# Patient Record
Sex: FEMALE | Race: WHITE | Employment: FULL TIME | URBAN - METROPOLITAN AREA
[De-identification: names, ages, dates, MRNs, and addresses within clinical notes are randomized per-mention and may not be internally consistent; named-entity substitution may affect disease eponyms.]

---

## 2017-07-17 ENCOUNTER — HOSPITAL ENCOUNTER (EMERGENCY)
Facility: CLINIC | Age: 22
Discharge: HOME | End: 2017-07-17
Attending: EMERGENCY MEDICINE | Admitting: EMERGENCY MEDICINE
Payer: COMMERCIAL

## 2017-07-17 ENCOUNTER — APPOINTMENT (OUTPATIENT)
Dept: GENERAL RADIOLOGY | Facility: CLINIC | Age: 22
End: 2017-07-17
Attending: EMERGENCY MEDICINE
Payer: COMMERCIAL

## 2017-07-17 VITALS
BODY MASS INDEX: 20.49 KG/M2 | HEIGHT: 64 IN | HEART RATE: 79 BPM | WEIGHT: 120 LBS | SYSTOLIC BLOOD PRESSURE: 133 MMHG | TEMPERATURE: 98.6 F | RESPIRATION RATE: 16 BRPM | DIASTOLIC BLOOD PRESSURE: 77 MMHG | OXYGEN SATURATION: 100 %

## 2017-07-17 DIAGNOSIS — M54.6 ACUTE MIDLINE THORACIC BACK PAIN: ICD-10-CM

## 2017-07-17 PROCEDURE — 72072 X-RAY EXAM THORAC SPINE 3VWS: CPT

## 2017-07-17 PROCEDURE — 99283 EMERGENCY DEPT VISIT LOW MDM: CPT

## 2017-07-17 RX ORDER — LEVONORGESTREL/ETHIN.ESTRADIOL 0.1-0.02MG
1 TABLET ORAL DAILY
COMMUNITY

## 2017-07-17 ASSESSMENT — ENCOUNTER SYMPTOMS
CHILLS: 0
FEVER: 0
BACK PAIN: 1

## 2017-07-17 NOTE — DISCHARGE INSTRUCTIONS
Discharge Instructions  Back Pain  You were seen today for back pain. Back pain can have many causes, but most will get better without surgery or other specific treatment. Sometimes there is a herniated ( slipped ) disc. We don t usually do MRI scans to look for these right away, since most herniated discs will get better on their own with time.  Today, we did not find any evidence that your back pain was caused by a serious condition, such as an infection, fracture, or tumor. However, sometimes symptoms develop over time and cannot be found during an emergency visit, so it is very important that you follow up with your primary doctor.  Return to the Emergency Department if:    You develop a fever with your back pain.     You have weakness or change in sensation in one or both legs.    You lose control of your bowels or bladder, or can t empty your bladder.    Your pain gets much worse.     Follow-up with your doctor:    Unless your pain has completely gone away, please make an appointment with your doctor within one week.  You may need further management of your back pain, such as more pain medication, imaging such as an X-ray or MRI, or physical therapy.    What can I do to help myself?    Remain Active -- People are often afraid that they will hurt their back further or delay recovery by remaining active, but this is one of the best things you can do for your back. In fact, prolonged bed rest is not recommended. Studies have shown that people with low back pain recover faster when they remain active. Movement helps to bring blood flow to the muscles and relieve muscle spasms as well as preventing loss of muscle strength.    Heat -- Using a heating pad can help with low back pain during the first few weeks. Do not sleep with a heating pad, as you can be burned.     Pain medications - You may take a pain medication such as Tylenol  (acetaminophen), Advil , Nuprin  (ibuprofen) or Aleve  (naproxen).  If you have  been given a narcotic such as Vicodin  (hydrocodone with acetaminophen), Percocet  (oxycodone with acetaminophen), codeine, or a muscle relaxant such as Flexeril  (cyclobenzaprine) or Soma  (carisoprodol), do not drive for four hours after you have taken it. If the narcotic contains Tylenol  (acetaminophen), do not take Tylenol  with it. All narcotics will cause constipation, so eat a high fiber diet.   If you were given a prescription for medicine here today, be sure to read all of the information (including the package insert) that comes with your prescription.  This will include important information about the medicine, its side effects, and any warnings that you need to know about.  The pharmacist who fills the prescription can provide more information and answer questions you may have about the medicine.  If you have questions or concerns that the pharmacist cannot address, please call or return to the Emergency Department.       Remember that you can always come back to the Emergency Department if you are not able to see your regular doctor in the amount of time listed above, if you get any new symptoms, or if there is anything that worries you.

## 2017-07-17 NOTE — ED NOTES
Upper mid back pain x 4 months.  No injury.  Has been taking ibuprofen and using icy hot patches without relief.  ABCDs intact.

## 2017-07-17 NOTE — ED PROVIDER NOTES
"  History     Chief Complaint:  Upper back pain    HPI   Mona Hassan is a 22 year old female who presents to the emergency department today for evaluation of upper mid back pain. The patient reports upper mid back pain since the end of April, accompanied by some chest tightness with deep breathing. She has tried ibuprofen and icy hot cream without alleviation of pain. The patient also reports feeling warm, but denies chills, fever, or heavy lifting. Of note, she got  in April, but hasn't been using new mattresses or chairs.    Allergies:  No Known Drug Allergies    Medications:    Levonorgestrel-ethinyl estradiol    Past Medical History:    H. Pylori infection    Past Surgical History:    History reviewed. No pertinent past surgical history.    Family History:    History reviewed. No pertinent family history.     Social History:  Smoking Status: Never smoker  Alcohol Use: No  Marital Status:      Review of Systems   Constitutional: Negative for chills and fever.   Musculoskeletal: Positive for back pain.   All other systems reviewed and are negative.    Physical Exam   Vitals:  Patient Vitals for the past 24 hrs:   BP Temp Temp src Pulse Resp SpO2 Height Weight   07/17/17 1125 133/77 98.6  F (37  C) Temporal 79 16 100 % 1.626 m (5' 4\") 54.4 kg (120 lb)     Physical Exam  Constitutional: Alert, attentive, GCS 15  HENT:    Nose: Nose normal.    Mouth/Throat: Oropharynx is clear, mucous membranes are moist  Eyes: EOM are normal. Pupils are equal, round, and reactive to light.   CV: brisk capillary refill to the distal extremities, 2+ DP pulses bilaterally.  Chest: Effort normal and breath sounds normal.   GI: No distension. There is no tenderness to the RUQ, RLQ or LLQ. No Hurtado's sign or McBurney's point tenderness. No palpable, pulsatile mass.  MSK: Normal range of motion. Vague T9/10 midline spine tenderness, and adjacent paraspinous muscle tenderness; no stepoffs;   Neurological: Alert, " attentive.  GCS 15; 5/5 strength throughout the bilateral lower extremities (hip flexion/extension, knee flexion/extension, DF/PF, EHL/FHL); sensation intact to light touch throughout L2-S1 distributions to the lower extremities; 2+ DTRs to the bilateral lower extremities (patellar, achilles); normal gait  Skin: Skin is warm and dry.       Emergency Department Course     Imaging:  Radiology findings were communicated with the patient who voiced understanding of the findings.    Thoracic spine XR, 3 views  Motion on the lateral view. Otherwise negative thoracic spine. Partially visualized lumbar scoliosis.  Reading per radiology    Emergency Department Course:  Nursing notes and vitals reviewed.  I performed an exam of the patient as documented above.   The patient was sent for a Thoracic spine XR, 3 views while in the emergency department, results above.   At 1250 the patient was rechecked and was updated on the results of imaging studies.   I discussed the treatment plan with the patient. She expressed understanding of this plan and consented to discharge. She will be discharged home with instructions for care and follow up. In addition, the patient will return to the emergency department if their symptoms persist, worsen, if new symptoms arise or if there is any concern.  All questions were answered.  I personally reviewed the imaging results with the Patient and answered all related questions prior to discharge.    Impression & Plan      Medical Decision Making:  This is a very pleasant 22 year old female who presented with low thoracic back pain for several months. There was no trauma or history of heavy lifting associated. Exam shows vague midline tenderness at ~T9/10 and nearby paraspinous muscle tenderness. X-rays show no acute abnormality. She has no history of IVDA or fever to suggest epidural abscess. She has a normal neurologic exam. I discussed the unclear nature of her symptoms and recommended  supportive cares for possible musculoskeletal pain, and I provided a spine referral. I advised she schedule spine follow-up within 3-5 days to re-assess symptoms and I advised strict return precautions for pain, fever, weakness, or any other concerning symptoms.        Diagnosis:    ICD-10-CM    1. Acute midline thoracic back pain M54.6      Disposition:   Home     Scribe Disclosure:  I, Teresa Dasilva, am serving as a scribe at 11:37 AM on 7/17/2017 to document services personally performed by Harsha Hernandez MD, based on my observations and the provider's statements to me.    7/17/2017   [facility] EMERGENCY DEPARTMENT       Harsha Hernandez MD  07/17/17 1403

## 2017-07-17 NOTE — ED AVS SNAPSHOT
[facility] Emergency Department    [address]    [facility] [address]    Phone:  [phone]    Fax:  [phone]                                       Mona Hassan   MRN: [id number]    Department:  [facility] Emergency Department   Date of Visit:  7/17/2017           After Visit Summary Signature Page     I have received my discharge instructions, and my questions have been answered. I have discussed any challenges I see with this plan with the nurse or doctor.    ..........................................................................................................................................  Patient/Patient Representative Signature      ..........................................................................................................................................  Patient Representative Print Name and Relationship to Patient    ..................................................               ................................................  Date                                            Time    ..........................................................................................................................................  Reviewed by Signature/Title    ...................................................              ..............................................  Date                                                            Time

## 2017-07-17 NOTE — ED AVS SNAPSHOT
[facility] Emergency Department    [address] [place] [address]    Phone:  [phone]    Fax:  [phone]                                       Mona Hassan   MRN: [id number]    Department:  [facility] Emergency Department   Date of Visit:  7/17/2017           Patient Information     Date Of Birth          1995        Your diagnoses for this visit were:     Acute midline thoracic back pain        You were seen by Harsha Hernandez MD.      Follow-up Information     Follow up with Leodan Hull MD In 1 week.    Specialty:  Neurosurgery    Contact information:    [facility]  [address]  [place] [address]  [phone]          Discharge Instructions         Discharge Instructions  Back Pain  You were seen today for back pain. Back pain can have many causes, but most will get better without surgery or other specific treatment. Sometimes there is a herniated ( slipped ) disc. We don t usually do MRI scans to look for these right away, since most herniated discs will get better on their own with time.  Today, we did not find any evidence that your back pain was caused by a serious condition, such as an infection, fracture, or tumor. However, sometimes symptoms develop over time and cannot be found during an emergency visit, so it is very important that you follow up with your primary doctor.  Return to the Emergency Department if:    You develop a fever with your back pain.     You have weakness or change in sensation in one or both legs.    You lose control of your bowels or bladder, or can t empty your bladder.    Your pain gets much worse.     Follow-up with your doctor:    Unless your pain has completely gone away, please make an appointment with your doctor within one week.  You may need further management of your back pain, such as more pain medication, imaging such as an X-ray or MRI, or physical  therapy.    What can I do to help myself?    Remain Active -- People are often afraid that they will hurt their back further or delay recovery by remaining active, but this is one of the best things you can do for your back. In fact, prolonged bed rest is not recommended. Studies have shown that people with low back pain recover faster when they remain active. Movement helps to bring blood flow to the muscles and relieve muscle spasms as well as preventing loss of muscle strength.    Heat -- Using a heating pad can help with low back pain during the first few weeks. Do not sleep with a heating pad, as you can be burned.     Pain medications - You may take a pain medication such as Tylenol  (acetaminophen), Advil , Nuprin  (ibuprofen) or Aleve  (naproxen).  If you have been given a narcotic such as Vicodin  (hydrocodone with acetaminophen), Percocet  (oxycodone with acetaminophen), codeine, or a muscle relaxant such as Flexeril  (cyclobenzaprine) or Soma  (carisoprodol), do not drive for four hours after you have taken it. If the narcotic contains Tylenol  (acetaminophen), do not take Tylenol  with it. All narcotics will cause constipation, so eat a high fiber diet.   If you were given a prescription for medicine here today, be sure to read all of the information (including the package insert) that comes with your prescription.  This will include important information about the medicine, its side effects, and any warnings that you need to know about.  The pharmacist who fills the prescription can provide more information and answer questions you may have about the medicine.  If you have questions or concerns that the pharmacist cannot address, please call or return to the Emergency Department.       Remember that you can always come back to the Emergency Department if you are not able to see your regular doctor in the amount of time listed above, if you get any new symptoms, or if there is anything that worries  you.        24 Hour Appointment Hotline       To make an appointment at any [facility], call [phone] ([phone]). If you don't have a family doctor or clinic, we will help you find one. [facility] clinics are conveniently located to serve the needs of you and your family.             Review of your medicines      Our records show that you are taking the medicines listed below. If these are incorrect, please call your family doctor or clinic.        Dose / Directions Last dose taken    IBUPROFEN PO        Refills:  0        levonorgestrel-ethinyl estradiol 0.1-20 MG-MCG per tablet   Commonly known as:  AVIANE,ALESSE,LESSINA   Dose:  1 tablet        Take 1 tablet by mouth daily   Refills:  0        menthol 5 % Ptch   Commonly known as:  ICY HOT   Dose:  1 patch        Apply 1 patch topically every 8 hours as needed for muscle soreness   Refills:  0                Procedures and tests performed during your visit     Thoracic spine XR, 3 views      Orders Needing Specimen Collection     None      Pending Results     No orders found from 7/15/2017 to 7/18/2017.            Pending Culture Results     No orders found from 7/15/2017 to 7/18/2017.            Pending Results Instructions     If you had any lab results that were not finalized at the time of your Discharge, you can call the ED Lab Result RN at [phone]. You will be contacted by this team for any positive Lab results or changes in treatment. The nurses are available 7 days a week from 10A to 6:30P.  You can leave a message 24 hours per day and they will return your call.        Test Results From Your Hospital Stay        7/17/2017 12:31 PM      Narrative     XR THORACIC SPINE 3 VW  7/17/2017 12:13 PM    HISTORY:  low thoracic spine pain    COMPARISON:  None.        Impression     IMPRESSION:  Motion on the lateral view. Otherwise negative thoracic  spine. Partially visualized lumbar scoliosis.    JACOB THRASHER MD                Clinical  Quality Measure: Blood Pressure Screening     Your blood pressure was checked while you were in the emergency department today. The last reading we obtained was  BP: 133/77 . Please read the guidelines below about what these numbers mean and what you should do about them.  If your systolic blood pressure (the top number) is less than 120 and your diastolic blood pressure (the bottom number) is less than 80, then your blood pressure is normal. There is nothing more that you need to do about it.  If your systolic blood pressure (the top number) is 120-139 or your diastolic blood pressure (the bottom number) is 80-89, your blood pressure may be higher than it should be. You should have your blood pressure rechecked within a year by a primary care provider.  If your systolic blood pressure (the top number) is 140 or greater or your diastolic blood pressure (the bottom number) is 90 or greater, you may have high blood pressure. High blood pressure is treatable, but if left untreated over time it can put you at risk for heart attack, stroke, or kidney failure. You should have your blood pressure rechecked by a primary care provider within the next 4 weeks.  If your provider in the emergency department today gave you specific instructions to follow-up with your doctor or provider even sooner than that, you should follow that instruction and not wait for up to 4 weeks for your follow-up visit.        Thank you for choosing [facility]       Thank you for choosing [facility] for your care. Our goal is always to provide you with excellent care. Hearing back from our patients is one way we can continue to improve our services. Please take a few minutes to complete the written survey that you may receive in the mail after you visit with us. Thank you!        [employer] Information     [employer] lets you send messages to your doctor, view your test results, renew your prescriptions, schedule appointments and more. To sign up, go to  [email or web address] . Click on \"Log in\" on the left side of the screen, which will take you to the Welcome page. Then click on \"Sign up Now\" on the right side of the page.     You will be asked to enter the access code listed below, as well as some personal information. Please follow the directions to create your username and password.     Your access code is: [email or web address]  Expires: 10/15/2017 12:58 PM     Your access code will  in 90 days. If you need help or a new code, please call your [facility] clinic or [phone].        Care EveryWhere ID     This is your Care EveryWhere ID. This could be used by other organizations to access your [facility] medical records  [id number]        Equal Access to Services     DELGADO CASTREJON : Rosetta Parker, tres buck, beka maki, paris salcedo. So [facility] [phone].    ATENCIÓN: Si habla español, tiene a neville disposición servicios gratuitos de asistencia lingüística. Llame al [phone].    We comply with applicable federal civil rights laws and [place] laws. We do not discriminate on the basis of race, color, national origin, age, disability sex, sexual orientation or gender identity.            After Visit Summary       This is your record. Keep this with you and show to your community pharmacist(s) and doctor(s) at your next visit.                  "

## 2017-07-17 NOTE — LETTER
[facility] EMERGENCY DEPARTMENT  [address] [place] [address]  [phone]    2017    Mona Cantully Mo  [address]   [place] [address]  [phone] (home)     : 1995      To Whom it may concern:    Mona Hassan was seen in our Emergency Department today, 2017.  Please excuse her from work today and tomorrow.    Sincerely,        Harsha Hernandez MD

## 2017-07-26 ENCOUNTER — OFFICE VISIT (OUTPATIENT)
Dept: NEUROSURGERY | Facility: CLINIC | Age: 22
End: 2017-07-26
Attending: NURSE PRACTITIONER
Payer: COMMERCIAL

## 2017-07-26 VITALS
OXYGEN SATURATION: 98 % | WEIGHT: 130 LBS | DIASTOLIC BLOOD PRESSURE: 52 MMHG | BODY MASS INDEX: 22.31 KG/M2 | SYSTOLIC BLOOD PRESSURE: 109 MMHG | HEART RATE: 68 BPM

## 2017-07-26 DIAGNOSIS — M54.6 THORACIC SPINE PAIN: Primary | ICD-10-CM

## 2017-07-26 PROCEDURE — 99211 OFF/OP EST MAY X REQ PHY/QHP: CPT | Performed by: NURSE PRACTITIONER

## 2017-07-26 PROCEDURE — 99203 OFFICE O/P NEW LOW 30 MIN: CPT | Performed by: NURSE PRACTITIONER

## 2017-07-26 ASSESSMENT — PAIN SCALES - GENERAL: PAINLEVEL: MILD PAIN (3)

## 2017-07-26 NOTE — PATIENT INSTRUCTIONS
1.  Please schedule your physical therapy.        2. Please contact the clinic if pain persists at [phone].   Then we will order a thoracic MRI.

## 2017-07-26 NOTE — NURSING NOTE
"Mona Hassan is a 22 year old female who presents for:  Chief Complaint   Patient presents with     Neurologic Problem     follow up from ED mid back pain        Initial Vitals:  /52  Pulse 68  Wt 59 kg (130 lb)  LMP 06/26/2017  SpO2 98%  BMI 22.31 kg/m2 Estimated body mass index is 22.31 kg/(m^2) as calculated from the following:    Height as of 7/17/17: 1.626 m (5' 4\").    Weight as of this encounter: 59 kg (130 lb).. Body surface area is 1.63 meters squared. BP completed using cuff size: small regular  Mild Pain (3)    Do you feel safe in your environment?  Yes  Do you need any refills today? No    Nursing Comments:   Patient rates bilateral low back  pain today as 3  5 min. nursing intake time    Discharge plan: See provider's dictation.    2 min. nursing discharge time  Zulema Beltran CMA    "

## 2017-07-26 NOTE — PROGRESS NOTES
Dr. Leodan Hull  [facility]  Neurosurgery Clinic Visit        CC: mid back pain    Primary care Provider: [facility], [place]      Reason For Visit:   I was asked by [facility] to consult on the patient for mid back pain.      HPI: Mona Hassan is a 22 year old female with mid back pain. She reports that had constant pain so she went to the ER.  She reports pain across the mid back but was concerned that she was told that she had low back scoliosis.  She has not had recent PT or injections. She denies any weakness or radicular symptoms. She feels that the pain is intermittent.      Pain at its worst 10  Pain right now:  3    Past Medical History:   Diagnosis Date     H. pylori infection        Past Medical History reviewed with patient during visit.    No past surgical history on file.  Past Surgical History reviewed with patient during visit.    Current Outpatient Prescriptions   Medication     levonorgestrel-ethinyl estradiol (AVIANE,ALESSE,LESSINA) 0.1-20 MG-MCG per tablet     IBUPROFEN PO     menthol (ICY HOT) 5 % PTCH     No current facility-administered medications for this visit.        No Known Allergies    Social History     Social History     Marital status: Single     Spouse name: N/A     Number of children: N/A     Years of education: N/A     Social History Main Topics     Smoking status: Never Smoker     Smokeless tobacco: None     Alcohol use No     Drug use: None     Sexual activity: Not Asked     Other Topics Concern     None     Social History Narrative       No family history on file.      Review Of Systems  Skin: negative  Eyes: negative  Ears/Nose/Throat: negative  Respiratory: No shortness of breath, dyspnea on exertion, cough, or hemoptysis  Cardiovascular: negative  Gastrointestinal: negative  Genitourinary: negative  Musculoskeletal: back pain  Neurologic: negative  Psychiatric: negative  Hematologic/Lymphatic/Immunologic: negative  Endocrine:  negative     ROS: 10 point ROS neg other than the symptoms noted above in the HPI.      Vital Signs: /52  Pulse 68  Wt 130 lb (59 kg)  LMP 06/26/2017  SpO2 98%  BMI 22.31 kg/m2    Examination:  Constitutional:  Alert, well nourished, NAD.  HEENT: Normocephalic, atraumatic.   Pulm:  Without shortness of breath   CV:  No pitting edema of BLE.    Neurological:  Awake  Alert  Oriented x 3  Speech clear  Cranial nerves II - XII intact  PERRL  EOMI  Face symmetric  Tongue midline  Motor exam   Shoulder Abduction:  Right:  5/5   Left:  5/5  Biceps:                      Right:  5/5   Left:  5/5  Triceps:                     Right:  5/5   Left:  5/5  Wrist Extensors:       Right:  5/5   Left:  5/5  Wrist Flexors:           Right:  5/5   Left:  5/5  Intrinsics:                   Right:  5/5   Left:  5/5   Hip Flexor:                Right: 5/5  Left:  5/5  Hip Adductor:             Right:  5/5  Left:  5/5  Hip Abductor:             Right:  5/5  Left:  5/5  Gastroc Soleus:        Right:  5/5  Left:  5/5  Tib/Ant:                      Right:  5/5  Left:  5/5  EHL:                          Right:  5/5  Left:  5/5   Sensation normal to bilateral upper and lower extremities    Gait: Able to stand from a seated position. Normal non-antalgic, non-myelopathic gait.  Able to heel/toe walk without loss of balance  Thoracic exam reveals full ROM and pain without pain upon palpation    Lumbar examination reveals no tenderness of the spine or paraspinous muscles.       Imaging: IMPRESSION:  Motion on the lateral view. Otherwise negative thoracic  spine. Partially visualized lumbar scoliosis.    Assessment/Plan:   Mona Hassan is a 22 year old female with mid back pain. She reports that had constant pain so she went to the ER.  She reports pain across the mid back but was concerned that she was told that she had low back scoliosis.  She has not had recent PT or injections. She denies any weakness or radicular symptoms.  She feels that the pain is intermittent.  Her exam today is normal. Explained that her lumbar scoliosis is mild.  She does not have pain to radicular symptoms.  It was recommended that she start with PT. She is open to this.        Patient Instructions   1.  Please schedule your physical therapy.        2. Please contact the clinic if pain persists at [phone].   Then we will order a thoracic MRI.          Ericka Walker [facility]  [facility]ine [facility]  [address] [address]    Tel [phone]  Pager [phone]

## 2017-07-26 NOTE — MR AVS SNAPSHOT
After Visit Summary   7/26/2017    Mona Hassan    MRN: [id number]           Patient Information     Date Of Birth          1995        Visit Information        Provider Department      7/26/2017 9:20 AM Ericka Walker APRN CNP [facility]        Today's Diagnoses     Thoracic spine pain    -  1      Care Instructions    1.  Please schedule your physical therapy.        2. Please contact the clinic if pain persists at [phone].   Then we will order a thoracic MRI.           Follow-ups after your visit        Additional Services     [facility] PT, HAND, AND CHIROPRACTIC REFERRAL       **This order will print in the [facility] Scheduling Office**    Physical Therapy, Hand Therapy and Chiropractic Care are available through:    *[facility]  *[facility]  *[facility]    Call one number to schedule at any of the above locations: [phone].    Your provider has referred you to: Physical Therapy at [facility] or [facility]    Indication/Reason for Referral: thoracic spine pain   Onset of Illness: chronic  Therapy Orders: Evaluate and Treat  Special Programs: None  Special Request: None    Duane Zepeda      Additional Comments for the Therapist or Chiropractor:         Please be aware that coverage of these services is subject to the terms and limitations of your health insurance plan.  Call member services at your health plan with any benefit or coverage questions.      Please bring the following to your appointment:    *Your personal calendar for scheduling future appointments  *Comfortable clothing                  Who to contact     If you have questions or need follow up information about today's clinic visit or your schedule please contact [facility] directly at [phone].  Normal or non-critical lab and imaging results will be communicated to you by MyChart, letter or phone within 4 business days after  "the clinic has received the results. If you do not hear from us within 7 days, please contact the clinic through [employer] or phone. If you have a critical or abnormal lab result, we will notify you by phone as soon as possible.  Submit refill requests through [employer] or call your pharmacy and they will forward the refill request to us. Please allow 3 business days for your refill to be completed.          Additional Information About Your Visit        [employer] Information     [employer] lets you send messages to your doctor, view your test results, renew your prescriptions, schedule appointments and more. To sign up, go to [email or web address] . Click on \"Log in\" on the left side of the screen, which will take you to the Welcome page. Then click on \"Sign up Now\" on the right side of the page.     You will be asked to enter the access code listed below, as well as some personal information. Please follow the directions to create your username and password.     Your access code is: [email or web address]  Expires: 10/15/2017 12:58 PM     Your access code will  in 90 days. If you need help or a new code, please call your [place] clinic or [phone].        Care EveryWhere ID     This is your Care EveryWhere ID. This could be used by other organizations to access your [place] medical records  [id number]        Your Vitals Were     Pulse Last Period Pulse Oximetry BMI (Body Mass Index)          68 2017 98% 22.31 kg/m2         Blood Pressure from Last 3 Encounters:   17 109/52   17 133/77   16 103/63    Weight from Last 3 Encounters:   17 130 lb (59 kg)   17 120 lb (54.4 kg)   16 115 lb (52.2 kg)              We Performed the Following     [facility] PT, HAND, AND CHIROPRACTIC REFERRAL        Primary Care Provider Office Phone # Fax #    [facility] [phone] [address] [place] [address]        Equal Access to Services     DELGADO CASTREJON AH: " Hadii aad ku hadaltafo Soinderali, waaxda luqadaha, qaybta kaalwilder maki, paris miladavid salcedo. So [facility] [phone].    ATENCIÓN: Si kaylee bower, tiene a neville disposición servicios gratuitos de asistencia lingüística. Llame al [phone].    We comply with applicable federal civil rights laws and [place] laws. We do not discriminate on the basis of race, color, national origin, age, disability sex, sexual orientation or gender identity.            Thank you!     Thank you for choosing [facility]  for your care. Our goal is always to provide you with excellent care. Hearing back from our patients is one way we can continue to improve our services. Please take a few minutes to complete the written survey that you may receive in the mail after your visit with us. Thank you!             Your Updated Medication List - Protect others around you: Learn how to safely use, store and throw away your medicines at www.disposemymeds.org.          This list is accurate as of: 7/26/17  9:48 AM.  Always use your most recent med list.                   Brand Name Dispense Instructions for use Diagnosis    IBUPROFEN PO           levonorgestrel-ethinyl estradiol 0.1-20 MG-MCG per tablet    ZOE WEINER LESSINA     Take 1 tablet by mouth daily        menthol 5 % Ptch    ICY HOT     Apply 1 patch topically every 8 hours as needed for muscle soreness

## 2017-08-14 ENCOUNTER — THERAPY VISIT (OUTPATIENT)
Dept: PHYSICAL THERAPY | Facility: CLINIC | Age: 22
End: 2017-08-14
Payer: COMMERCIAL

## 2017-08-14 DIAGNOSIS — M54.6 ACUTE BILATERAL THORACIC BACK PAIN: Primary | ICD-10-CM

## 2017-08-14 PROCEDURE — 97110 THERAPEUTIC EXERCISES: CPT | Mod: GP | Performed by: PHYSICAL THERAPIST

## 2017-08-14 PROCEDURE — 97161 PT EVAL LOW COMPLEX 20 MIN: CPT | Mod: GP | Performed by: PHYSICAL THERAPIST

## 2017-08-14 NOTE — PROGRESS NOTES
Subjective:    Patient is a 22 year old female presenting with rehab cervical spine hpi. The history is provided by the patient. No  was used.   Mona Hassan is a 22 year old female with a thoracic spine condition.  Condition occurred with:  Repetition/overuse.  Condition occurred: for unknown reasons and at work.  This is a new condition  Onset of bilateral thoracic pain 4-17 secondary to repetitive lifting at work. Pt can't recall on specific incident that brought on the pain. Pt seen at [facility] ED secondary to pain on 7-17-17 with follow up appointment with Ericka Walker on 7-26-17. Pt referred for physical therapy on 7-26-17.    Patient reports pain:  Thoracic right side and thoracic left side.    Pain is described as aching and sharp and is intermittent and reported as 4/10.  Associated symptoms:  Loss of motion/stiffness and loss of strength. Pain is the same all the time.  Symptoms are exacerbated by lifting, carrying and driving (working on the computer, pushing a grocery cart) and relieved by rest and NSAID's.  Since onset symptoms are gradually improving.  Special tests:  X-ray (see report).  Previous treatment: none.    General health as reported by patient is excellent.  Pertinent medical history includes:  None.  Medical allergies: no.  Other surgeries include:  No.  Medication history: birth controll.  Current occupation is Nursing assistant.  Patient is working in normal job without restrictions.  Primary job tasks include:  Prolonged standing, lifting and repetitive tasks.        Red flags:  None as reported by the patient.                        Objective:    Standing Alignment:      Shoulder/upper extremity deviations alignment: forward head and shoulder posture, thoracic kyphosis.                                  Cervical/Thoracic Evaluation  Cervical AROM: normal     AROM:    AROM Thoracic:    Flexion:               80%  Extension:          50%  Rotation:             Left: 60%     Right: 60%      Headaches: none  Cervical Myotomes:  normal                  DTR's:  normal          Cervical Dermatomes:  normal                    Cervical Palpation:  : point tenderness T1-T7, muscle guarding and tenderness to palpation bilateral rhomboids.                                                      General     ROS    Assessment/Plan:      Patient is a 22 year old female with thoracic complaints.    Patient has the following significant findings with corresponding treatment plan.                Diagnosis 1:  Thoracic strain  Pain -  hot/cold therapy, self management, education, home program and manual therapy as indicated  Decreased ROM/flexibility - therapeutic exercise, therapeutic activity, home program and manual therapy as indicated  Decreased strength - therapeutic exercise, therapeutic activities and home program    Therapy Evaluation Codes:   1) History comprised of:   Personal factors that impact the plan of care:      Profession.    Comorbidity factors that impact the plan of care are:      Weakness.     Medications impacting care: birth control.  2) Examination of Body Systems comprised of:   Body structures and functions that impact the plan of care:      Cervical spine, Shoulder and Thoracic Spine.   Activity limitations that impact the plan of care are:      Driving, Lifting, Standing, Working and pushing/pulling.  3) Clinical presentation characteristics are:   Stable/Uncomplicated.  4) Decision-Making    Low complexity using standardized patient assessment instrument and/or measureable assessment of functional outcome.  Cumulative Therapy Evaluation is: Low complexity.    Previous and current functional limitations:  (See Goal Flow Sheet for this information)    Short term and Long term goals: (See Goal Flow Sheet for this information)     Communication ability:  Patient appears to be able to clearly communicate and understand verbal and written communication and follow  directions correctly.  Treatment Explanation - The following has been discussed with the patient:   RX ordered/plan of care  Anticipated outcomes  Possible risks and side effects  This patient would benefit from PT intervention to resume normal activities.   Rehab potential is good.    Frequency:  1 X week, once daily  Duration:  for 6 weeks  Discharge Plan:  Achieve all LTG.  Independent in home treatment program.  Reach maximal therapeutic benefit.    Please refer to the daily flowsheet for treatment today, total treatment time and time spent performing 1:1 timed codes.

## 2017-08-14 NOTE — MR AVS SNAPSHOT
"              After Visit Summary   2017    Mona Hassan    MRN: [id number]           Patient Information     Date Of Birth          1995        Visit Information        Provider Department      2017 9:50 AM Jordon Velazquez, PT [facility]        Today's Diagnoses     Acute bilateral thoracic back pain    -  1       Follow-ups after your visit        Who to contact     If you have questions or need follow up information about today's clinic visit or your schedule please contact [facility] directly at [phone].  Normal or non-critical lab and imaging results will be communicated to you by Yaolan.comhart, letter or phone within 4 business days after the clinic has received the results. If you do not hear from us within 7 days, please contact the clinic through Yaolan[employer]comhart or phone. If you have a critical or abnormal lab result, we will notify you by phone as soon as possible.  Submit refill requests through [employer] or call your pharmacy and they will forward the refill request to us. Please allow 3 business days for your refill to be completed.          Additional Information About Your Visit        MyChart Information     [employer] lets you send messages to your doctor, view your test results, renew your prescriptions, schedule appointments and more. To sign up, go to [email or web address] . Click on \"Log in\" on the left side of the screen, which will take you to the Welcome page. Then click on \"Sign up Now\" on the right side of the page.     You will be asked to enter the access code listed below, as well as some personal information. Please follow the directions to create your username and password.     Your access code is: [email or web address]  Expires: 10/15/2017 12:58 PM     Your access code will  in 90 days. If you need help or a new code, please call your [place] clinic or [phone].        Care EveryWhere ID     This is your Care EveryWhere ID. This could be used by other " organizations to access your [place] medical records  [id number]        Your Vitals Were     Last Period                   06/26/2017            Blood Pressure from Last 3 Encounters:   07/26/17 109/52   07/17/17 133/77   04/02/16 103/63    Weight from Last 3 Encounters:   07/26/17 59 kg (130 lb)   07/17/17 54.4 kg (120 lb)   04/01/16 52.2 kg (115 lb)              We Performed the Following     [facility] Inital Eval Report     PT Eval, Low Complexity (49072)     Therapeutic Exercises        Primary Care Provider Office Phone # Fax #    [place] [facility] [phone] [address] [place] [address]        Equal Access to Services     DELGADO CASTREJON : Hadii aad ku hadasho Soomaali, waaxda luqadaha, qaybta kaalmada adeegyada, waxay milain haychantal price . So [facility] [phone].    ATENCIÓN: Si habla español, tiene a neville disposición servicios gratuitos de asistencia lingüística. Ll[facility] [phone].    We comply with applicable federal civil rights laws and [place] laws. We do not discriminate on the basis of race, color, national origin, age, disability sex, sexual orientation or gender identity.            Thank you!     Thank you for choosing [facility]  for your care. Our goal is always to provide you with excellent care. Hearing back from our patients is one way we can continue to improve our services. Please take a few minutes to complete the written survey that you may receive in the mail after your visit with us. Thank you!             Your Updated Medication List - Protect others around you: Learn how to safely use, store and throw away your medicines at www.disposemymeds.org.          This list is accurate as of: 8/14/17 12:44 PM.  Always use your most recent med list.                   Brand Name Dispense Instructions for use Diagnosis    IBUPROFEN PO           levonorgestrel-ethinyl estradiol 0.1-20 MG-MCG per tablet    ZOE WEINER LESSINA     Take 1  tablet by mouth daily        menthol 5 % Ptch    ICY HOT     Apply 1 patch topically every 8 hours as needed for muscle soreness

## 2017-08-29 ENCOUNTER — THERAPY VISIT (OUTPATIENT)
Dept: PHYSICAL THERAPY | Facility: CLINIC | Age: 22
End: 2017-08-29
Payer: COMMERCIAL

## 2017-08-29 DIAGNOSIS — M54.6 ACUTE BILATERAL THORACIC BACK PAIN: ICD-10-CM

## 2017-08-29 PROCEDURE — 97110 THERAPEUTIC EXERCISES: CPT | Mod: GP | Performed by: PHYSICAL THERAPIST

## 2017-08-29 PROCEDURE — 97530 THERAPEUTIC ACTIVITIES: CPT | Mod: GP | Performed by: PHYSICAL THERAPIST

## 2017-08-29 NOTE — MR AVS SNAPSHOT
"              After Visit Summary   2017    Mona Hassan    MRN: [id number]           Patient Information     Date Of Birth          1995        Visit Information        Provider Department      2017 3:20 PM Jordon Velazquez, PT [facility]        Today's Diagnoses     Acute bilateral thoracic back pain           Follow-ups after your visit        Who to contact     If you have questions or need follow up information about today's clinic visit or your schedule please contact [facility] directly at [phone].  Normal or non-critical lab and imaging results will be communicated to you by Ev[employer]hart, letter or phone within 4 business days after the clinic has received the results. If you do not hear from us within 7 days, please contact the clinic through [employer] or phone. If you have a critical or abnormal lab result, we will notify you by phone as soon as possible.  Submit refill requests through [employer] or call your pharmacy and they will forward the refill request to us. Please allow 3 business days for your refill to be completed.          Additional Information About Your Visit        MyChart Information     [employer] lets you send messages to your doctor, view your test results, renew your prescriptions, schedule appointments and more. To sign up, go to [email or web address] . Click on \"Log in\" on the left side of the screen, which will take you to the Welcome page. Then click on \"Sign up Now\" on the right side of the page.     You will be asked to enter the access code listed below, as well as some personal information. Please follow the directions to create your username and password.     Your access code is: [email or web address]  Expires: 10/15/2017 12:58 PM     Your access code will  in 90 days. If you need help or a new code, please call your [place] clinic or [phone].        Care EveryWhere ID     This is your Care EveryWhere ID. This could be used by other " organizations to access your [place] medical records  [id number]         Blood Pressure from Last 3 Encounters:   07/26/17 109/52   07/17/17 133/77   04/02/16 103/63    Weight from Last 3 Encounters:   07/26/17 59 kg (130 lb)   07/17/17 54.4 kg (120 lb)   04/01/16 52.2 kg (115 lb)              We Performed the Following     Therapeutic Activities     Therapeutic Exercises        Primary Care Provider Office Phone # Fax #    [facility] [phone] [address] [place] [address]        Equal Access to Services     [facility] Hospital[facility] : Hadii aad ku hadasho Soomaali, waaxda luqadaha, qaybta kaalmada adeegyada, paris gilliam haychantal aderalph price . So [facility] [phone].    ATENCIÓN: Si habla español, tiene a neville disposición servicios gratuitos de asistencia lingüística. Llame al [phone].    We comply with applicable federal civil rights laws and [place] laws. We do not discriminate on the basis of race, color, national origin, age, disability sex, sexual orientation or gender identity.            Thank you!     Thank you for choosing [facility]  for your care. Our goal is always to provide you with excellent care. Hearing back from our patients is one way we can continue to improve our services. Please take a few minutes to complete the written survey that you may receive in the mail after your visit with us. Thank you!             Your Updated Medication List - Protect others around you: Learn how to safely use, store and throw away your medicines at www.disposemymeds.org.          This list is accurate as of: 8/29/17  3:55 PM.  Always use your most recent med list.                   Brand Name Dispense Instructions for use Diagnosis    IBUPROFEN PO           levonorgestrel-ethinyl estradiol 0.1-20 MG-MCG per tablet    ZOE WEINER LESSINA     Take 1 tablet by mouth daily        menthol 5 % Ptch    ICY HOT     Apply 1 patch topically every 8 hours as needed  for muscle soreness

## 2017-08-29 NOTE — PROGRESS NOTES
Subjective:    HPI                    Objective:    System    Physical Exam    General     ROS    Assessment/Plan:      SUBJECTIVE  Subjective changes as noted by pt:  Pt notes decreased pain and increased strength     Current pain level: 1/10     Changes in function:  Pt feels 90% normal. Pt notes some difficulty with lifting and transferring pt's at work     Adverse reaction to treatment or activity:  None    OBJECTIVE  Changes in objective findings:  Thoracic ROM WNL. Mild point tenderness T4-T7. Improved strength scapular stabilizers        ASSESSMENT  Mona continues to require intervention to meet STG and LTG's: PT  Patient's symptoms are resolving.  Response to therapy has shown an improvement in  pain level, strength and function  Progress made towards STG/LTG?  Yes,     PLAN  Current treatment program is being advanced to more complex exercises.    PTA/ATC plan:  N/A    Please refer to the daily flowsheet for treatment today, total treatment time and time spent performing 1:1 timed codes.

## 2017-10-25 PROBLEM — M54.6 ACUTE BILATERAL THORACIC BACK PAIN: Status: RESOLVED | Noted: 2017-08-14 | Resolved: 2017-10-25

## 2017-10-25 NOTE — PROGRESS NOTES
Subjective:SUBJECTIVE  Subjective changes as noted by pt:  Pt notes decreased pain and increased strength     Current pain level: 1/10     Changes in function:  Pt feels 90% normal. Pt notes some difficulty with lifting and transferring pt's at work     Adverse reaction to treatment or activity:  None     OBJECTIVE  Changes in objective findings:  Thoracic ROM WNL. Mild point tenderness T4-T7. Improved strength scapular stabilizers  HPI                    Objective:    System    Physical Exam    General     ROS    Assessment/Plan:      ASSESSMENT/PLAN  Updated problem list and treatment plan: Diagnosis 1:  Thoracic pain  Pain -  hot/cold therapy, self management, education and home program  Decreased ROM/flexibility - therapeutic exercise, therapeutic activity and home program  Decreased strength - therapeutic exercise, therapeutic activities and home program  Progress toward STG/LTGs have been made:  Yes,   Assessment of Progress: The patient's condition is improving.  Self Management Plans:  Patient has been instructed in a home treatment program.  I have re-evaluated this patient and find that the nature, scope, duration and intensity of the therapy is appropriate for the medical condition of the patient.  Mona continues to require the following intervention to meet STG and LT's:  PT    Recommendations:  Pt has not returned for treatment since 8-29-17. Pt discharged at this time.      Please refer to the daily flowsheet for treatment today, total treatment time and time spent performing 1:1 timed codes.

## 2019-08-09 ENCOUNTER — HOSPITAL ENCOUNTER (EMERGENCY)
Facility: CLINIC | Age: 24
Discharge: HOME | End: 2019-08-09
Attending: EMERGENCY MEDICINE | Admitting: EMERGENCY MEDICINE
Payer: OTHER MISCELLANEOUS

## 2019-08-09 VITALS
DIASTOLIC BLOOD PRESSURE: 62 MMHG | SYSTOLIC BLOOD PRESSURE: 115 MMHG | HEART RATE: 84 BPM | RESPIRATION RATE: 20 BRPM | OXYGEN SATURATION: 99 % | TEMPERATURE: 98.5 F

## 2019-08-09 PROCEDURE — 86803 HEPATITIS C AB TEST: CPT | Performed by: EMERGENCY MEDICINE

## 2019-08-09 PROCEDURE — 36415 COLL VENOUS BLD VENIPUNCTURE: CPT | Performed by: EMERGENCY MEDICINE

## 2019-08-09 PROCEDURE — 87522 HEPATITIS C REVRS TRNSCRPJ: CPT | Performed by: EMERGENCY MEDICINE

## 2019-08-09 PROCEDURE — 86704 HEP B CORE ANTIBODY TOTAL: CPT | Performed by: EMERGENCY MEDICINE

## 2019-08-09 PROCEDURE — 86706 HEP B SURFACE ANTIBODY: CPT | Performed by: EMERGENCY MEDICINE

## 2019-08-09 PROCEDURE — 87340 HEPATITIS B SURFACE AG IA: CPT | Performed by: EMERGENCY MEDICINE

## 2019-08-09 PROCEDURE — 99283 EMERGENCY DEPT VISIT LOW MDM: CPT

## 2019-08-09 PROCEDURE — 87389 HIV-1 AG W/HIV-1&-2 AB AG IA: CPT | Performed by: EMERGENCY MEDICINE

## 2019-08-09 ASSESSMENT — ENCOUNTER SYMPTOMS: WOUND: 1

## 2019-08-09 NOTE — ED PROVIDER NOTES
History     Chief Complaint:  Bodily Fluid Exposure     Mona Hassan is a 24 year old female who presents with body fluid exposure. 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 Afterwards she washed it out for 30 seconds with soap and water. The patient knows the patient that she was administering the insulin to and is able to get the results of his screening blood work within 24 hours and states that he has no known history of HIV or hepatitis B or C. Of note, the patient is right handed.     Allergies:  The patient has no known drug allergies.    Medications:    Aviane      Past Medical History:    The patient denies any significant past medical history.    Past Surgical History:    The patient does not have any pertinent past surgical history.    Family History:    No past pertinent family history.    Social History:  Negative for tobacco use.  Negative for alcohol use.  Marital Status:  Single [1]     Review of Systems   Skin: Positive for wound.   All other systems reviewed and are negative.    Physical Exam     Patient Vitals for the past 24 hrs:   BP Temp Temp src Pulse Resp SpO2   08/09/19 1637 115/62 98.5  F (36.9  C) Temporal 84 20 99 %       Physical Exam  VS: Reviewed per above  HENT: Mucous membranes moist  EYES: sclera anicteric  CV: Rate as noted,  regular rhythm.   RESP: Effort normal.   NEURO: Alert, moving all extremities  MSK: No deformity of the extremities  SKIN: Warm and dry. Petechial lesion overlying left index finger pad.     Emergency Department Course   Laboratory:  HIV Antigen: Pending  Hepatitis B Core antibody: pending  Hepatitis B Surface antibody: pending   Hepatitis B Surface antigen: Pending   Hepatitis C Core antibody: pending  Hepatitis C RNA Quantitative: pending     Emergency Department Course:  Nursing notes and  vitals reviewed. (1646) I performed an exam of the patient as documented above.     Blood drawn. This was sent to the lab for further testing, results above.    Findings and plan explained to the Patient. Patient discharged home with instructions regarding supportive care, medications, and reasons to return. The importance of close follow-up was reviewed.    I personally reviewed the laboratory results with the Patient and answered all related questions prior to discharge.     Impression & Plan    Medical Decision Making:  Pt presents to the ER for evaluation of needlestick injury. Exam with e/o pinpoint needlestick injury to left distal index finger pad. No signs superimposed infection. Pt knows that pt has no h/o HIV or Hep C/Hep B. Source pt's blood was sent for testing per pt. She is supposed to get results tomorrow. Exposure labs were sent. Pt was counseled to f/u with PCP for results and return for redness/swelling of finger that might suggest infection.    Diagnosis:    ICD-10-CM    1. Needlestick injury of finger, initial encounter S61.239A Hepatitis C antibody - Baseline    W27.3XXA Hepatitis C RNA quantitative - Baseline     HIV Antigen Antibody Combo - Baseline     Hepatitis B core antibody - Baseline     Hepatitis B Surface Antibody - Baseline     Hepatitis B surface antigen - Baseline     CANCELED: Hepatitis C antibody - Baseline     CANCELED: Hepatitis C RNA quantitative - Baseline     CANCELED: HIV Antigen Antibody Combo - Baseline     CANCELED: Hepatitis B core antibody - Baseline     CANCELED: Hepatitis B Surface Antibody - Baseline     CANCELED: Hepatitis B surface antigen - Baseline       Disposition:  discharged to home    Discharge Medications:  Discharge Medication List as of 8/9/2019  6:46 PM        Scribe Disclosure:  I, Bernice Lanier, am serving as a scribe on 8/9/2019 at 4:46 PM to personally document services performed by Jaskaran Marquez MD based on my observations and the provider's  statements to me.         Bernice Lanier  8/9/2019   [facility] EMERGENCY DEPARTMENT       Jaskaran Marquez MD  08/09/19 6922

## 2019-08-09 NOTE — ED TRIAGE NOTES
Arrives with body fluid exposure after being stuck with an insulin needle at work. Alert and oriented, ABCs intact.

## 2019-08-09 NOTE — DISCHARGE INSTRUCTIONS
Return for evaluation if the source patient's screening labs return positive or your finger becomes more red or swollen.

## 2019-08-09 NOTE — ED AVS SNAPSHOT
[facility] Emergency Department  [address]  [facility] [address]  Phone:  [phone]  Fax:  [phone]                                    Mona Hassan   MRN: [id number]    Department:  [facility] Emergency Department   Date of Visit:  8/9/2019           After Visit Summary Signature Page    I have received my discharge instructions, and my questions have been answered. I have discussed any challenges I see with this plan with the nurse or doctor.    ..........................................................................................................................................  Patient/Patient Representative Signature      ..........................................................................................................................................  Patient Representative Print Name and Relationship to Patient    ..................................................               ................................................  Date                                   Time    ..........................................................................................................................................  Reviewed by Signature/Title    ...................................................              ..............................................  Date                                               Time          22EPIC Rev 08/18

## 2019-08-12 LAB
HBV CORE AB SERPL QL IA: NONREACTIVE
HBV SURFACE AB SERPL IA-ACNC: 1.7 M[IU]/ML
HBV SURFACE AG SERPL QL IA: NONREACTIVE
HCV AB SERPL QL IA: NONREACTIVE
HCV RNA SERPL NAA+PROBE-ACNC: NORMAL [IU]/ML
HCV RNA SERPL NAA+PROBE-LOG IU: NORMAL LOG IU/ML
HIV 1+2 AB+HIV1 P24 AG SERPL QL IA: NONREACTIVE

## 2019-08-13 ENCOUNTER — TELEPHONE (OUTPATIENT)
Dept: EMERGENCY MEDICINE | Facility: CLINIC | Age: 24
End: 2019-08-13

## 2019-08-13 NOTE — TELEPHONE ENCOUNTER
[facility] Emergency Department Lab result notification:    Reason for call  Blood and body Fluid Exposure Protocol  Lab Result  The following blood and bodily fluid lab results from a recent exposure were all negative (nonreactive) for:     Hepatitis C antibody     Hepatitis C RNA quantitative    Hepatitis B surface antigen (agn)    Hepatitis B surface antibody (ninfa)  [Note: If vaccinated for Hep B (3 shot series) and result Negative, Patient is instructed to followup with PCP clinic within 72 hours].    Hepatitis B Core antibody    HIV Antigen Antibody Combo.       ED visit Date: 8/9/19  Symptoms reported at ED visit Mona Hassan is a 24 year old female who presents with body fluid exposure. 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 Afterwards she washed it out for 30 seconds with soap and water. The patient knows the patient that she was administering the insulin to and is able to get the results of his screening blood work within 24 hours and states that he has no known history of HIV or hepatitis B or C. Of note, the patient is right handed.       Miscellaneous information      Recommendations/Instructions  Mona to f/u with PCP for Hep B results.    Contact your PCP clinic or return to the Emergency department if your:    Symptoms return.    Symptoms worsen or other concerning symptom's.    Chantell Payne RN    [address]   Lung Nodule and ED Lab Results F/U RN  Epic pool (ED late result f/u RN) : P [id number]   # [phone]